# Patient Record
Sex: FEMALE | Race: WHITE | NOT HISPANIC OR LATINO | Employment: OTHER | URBAN - METROPOLITAN AREA
[De-identification: names, ages, dates, MRNs, and addresses within clinical notes are randomized per-mention and may not be internally consistent; named-entity substitution may affect disease eponyms.]

---

## 2017-01-05 ENCOUNTER — APPOINTMENT (OUTPATIENT)
Dept: CARDIAC REHAB | Facility: HOSPITAL | Age: 72
End: 2017-01-05

## 2017-01-07 ENCOUNTER — APPOINTMENT (OUTPATIENT)
Dept: CARDIAC REHAB | Facility: HOSPITAL | Age: 72
End: 2017-01-07

## 2017-01-12 ENCOUNTER — APPOINTMENT (OUTPATIENT)
Dept: CARDIAC REHAB | Facility: HOSPITAL | Age: 72
End: 2017-01-12

## 2017-01-14 ENCOUNTER — APPOINTMENT (OUTPATIENT)
Dept: CARDIAC REHAB | Facility: HOSPITAL | Age: 72
End: 2017-01-14

## 2017-01-19 ENCOUNTER — APPOINTMENT (OUTPATIENT)
Dept: CARDIAC REHAB | Facility: HOSPITAL | Age: 72
End: 2017-01-19

## 2017-01-21 ENCOUNTER — APPOINTMENT (OUTPATIENT)
Dept: CARDIAC REHAB | Facility: HOSPITAL | Age: 72
End: 2017-01-21

## 2017-01-26 ENCOUNTER — APPOINTMENT (OUTPATIENT)
Dept: CARDIAC REHAB | Facility: HOSPITAL | Age: 72
End: 2017-01-26

## 2017-01-28 ENCOUNTER — APPOINTMENT (OUTPATIENT)
Dept: CARDIAC REHAB | Facility: HOSPITAL | Age: 72
End: 2017-01-28

## 2017-01-30 ENCOUNTER — LAB (OUTPATIENT)
Dept: ENDOCRINOLOGY | Age: 72
End: 2017-01-30

## 2017-01-30 DIAGNOSIS — E55.9 UNSPECIFIED VITAMIN D DEFICIENCY: Primary | ICD-10-CM

## 2017-01-30 DIAGNOSIS — E55.9 UNSPECIFIED VITAMIN D DEFICIENCY: ICD-10-CM

## 2017-01-30 DIAGNOSIS — E10.42 DIABETIC PERIPHERAL NEUROPATHY ASSOCIATED WITH TYPE 1 DIABETES MELLITUS (HCC): ICD-10-CM

## 2017-01-30 DIAGNOSIS — E10.8 TYPE 1 DIABETES MELLITUS WITH COMPLICATIONS (HCC): ICD-10-CM

## 2017-01-30 DIAGNOSIS — E03.9 PRIMARY HYPOTHYROIDISM: ICD-10-CM

## 2017-01-30 DIAGNOSIS — I10 ESSENTIAL HYPERTENSION: Primary | ICD-10-CM

## 2017-01-30 DIAGNOSIS — I10 ESSENTIAL HYPERTENSION: ICD-10-CM

## 2017-01-31 LAB
25(OH)D3+25(OH)D2 SERPL-MCNC: 78.5 NG/ML (ref 30–100)
ALBUMIN SERPL-MCNC: 3.9 G/DL (ref 3.5–5.2)
ALBUMIN/GLOB SERPL: 1.5 G/DL
ALP SERPL-CCNC: 111 U/L (ref 39–117)
ALT SERPL-CCNC: 14 U/L (ref 1–33)
AST SERPL-CCNC: 20 U/L (ref 1–32)
BILIRUB SERPL-MCNC: 0.6 MG/DL (ref 0.1–1.2)
BUN SERPL-MCNC: 13 MG/DL (ref 8–23)
BUN/CREAT SERPL: 21.7 (ref 7–25)
CALCIUM SERPL-MCNC: 9.3 MG/DL (ref 8.6–10.5)
CHLORIDE SERPL-SCNC: 99 MMOL/L (ref 98–107)
CHOLEST SERPL-MCNC: 146 MG/DL (ref 0–200)
CO2 SERPL-SCNC: 24.1 MMOL/L (ref 22–29)
CREAT SERPL-MCNC: 0.6 MG/DL (ref 0.57–1)
FT4I SERPL CALC-MCNC: 2.4 (ref 1.2–4.9)
GLOBULIN SER CALC-MCNC: 2.6 GM/DL
GLUCOSE SERPL-MCNC: 115 MG/DL (ref 65–99)
HBA1C MFR BLD: 5.99 % (ref 4.8–5.6)
HDLC SERPL-MCNC: 85 MG/DL (ref 40–60)
LDLC SERPL CALC-MCNC: 51 MG/DL (ref 0–100)
MICROALBUMIN UR-MCNC: 66.3 UG/ML
POTASSIUM SERPL-SCNC: 4.1 MMOL/L (ref 3.5–5.2)
PROT SERPL-MCNC: 6.5 G/DL (ref 6–8.5)
SODIUM SERPL-SCNC: 141 MMOL/L (ref 136–145)
T3FREE SERPL-MCNC: 2 PG/ML (ref 2–4.4)
T3RU NFR SERPL: 37 % (ref 24–39)
T4 FREE SERPL-MCNC: 1.34 NG/DL (ref 0.93–1.7)
T4 SERPL-MCNC: 6.6 UG/DL (ref 4.5–12)
TRIGL SERPL-MCNC: 49 MG/DL (ref 0–150)
TSH SERPL DL<=0.005 MIU/L-ACNC: 4.21 UIU/ML (ref 0.45–4.5)
VLDLC SERPL CALC-MCNC: 9.8 MG/DL (ref 5–40)

## 2017-02-02 ENCOUNTER — APPOINTMENT (OUTPATIENT)
Dept: CARDIAC REHAB | Facility: HOSPITAL | Age: 72
End: 2017-02-02

## 2017-02-04 ENCOUNTER — APPOINTMENT (OUTPATIENT)
Dept: CARDIAC REHAB | Facility: HOSPITAL | Age: 72
End: 2017-02-04

## 2017-02-06 ENCOUNTER — OFFICE VISIT (OUTPATIENT)
Dept: ENDOCRINOLOGY | Age: 72
End: 2017-02-06

## 2017-02-06 VITALS
WEIGHT: 108 LBS | BODY MASS INDEX: 21.2 KG/M2 | HEIGHT: 60 IN | DIASTOLIC BLOOD PRESSURE: 70 MMHG | SYSTOLIC BLOOD PRESSURE: 108 MMHG

## 2017-02-06 DIAGNOSIS — F32.A DEPRESSION, UNSPECIFIED DEPRESSION TYPE: ICD-10-CM

## 2017-02-06 DIAGNOSIS — E10.42 DIABETIC PERIPHERAL NEUROPATHY ASSOCIATED WITH TYPE 1 DIABETES MELLITUS (HCC): Primary | ICD-10-CM

## 2017-02-06 DIAGNOSIS — E03.9 PRIMARY HYPOTHYROIDISM: ICD-10-CM

## 2017-02-06 DIAGNOSIS — E10.8 TYPE 1 DIABETES MELLITUS WITH COMPLICATIONS (HCC): ICD-10-CM

## 2017-02-06 PROCEDURE — 99214 OFFICE O/P EST MOD 30 MIN: CPT | Performed by: NURSE PRACTITIONER

## 2017-02-06 RX ORDER — BUPROPION HYDROCHLORIDE 150 MG/1
TABLET ORAL
Qty: 30 TABLET | Refills: 2 | Status: SHIPPED | OUTPATIENT
Start: 2017-02-06 | End: 2017-05-03 | Stop reason: SDUPTHER

## 2017-02-06 RX ORDER — FUROSEMIDE 20 MG/1
TABLET ORAL
Qty: 90 TABLET | Refills: 1 | Status: SHIPPED | OUTPATIENT
Start: 2017-02-06 | End: 2017-08-13 | Stop reason: SDUPTHER

## 2017-02-06 NOTE — PROGRESS NOTES
Leatha Vazquez who presents for for evaluation and treatment of Type 1 diabetes mellitus insulin dependent.  The initial diagnosis of diabetes was made 30 years ago.      Diabetes location is system with the course being clinical course has fluctuated , the severity is Mild , and the modifying/allievating factors are  insulin pump. Insulin dosage review with Leatha Vazquez suggested compliance most of the time   .       Associated symptoms of hyperglycemia have been none.  Associated symptoms of hypoglycemia have been none. Patient reports  hypoglycemia threshold for symptoms is different blood values mg/dl .       The patient is currently taking home blood tests - Blood glucose testin-10 times daily, that are:  fasting- 1st thing in morning before eating or drinking  before each meal  before each meal and 1 or 2 hours after meal  fasting and bedtime  bedtime  anytime you feel symptoms of hyperglycemia or hypoglycemia (high or low blood sugars)  Novolog U100  per insulin pump      Compliance with blood glucose monitoring: good.     Exercise:rarely      Meal panning: The patient is using carbohydrate counting, but is not on a specified limit, being a pump user.    Home blood glucose testing daily: 8-10  FB to 130 mg/dl  before lunch 150 to 150 mg/dl  before dinner/supper 140 to 150 mg/dl - bedtime 100-170mg/dl  insulin pump upload  -YES - patient manually boluses 100% of the time does not use bolus wizard, she checks her blood sugars 3.6 times a day.  Changes or cannula and her sets out every 4 days.  With sensor meter over view report.  It does show multiple bolusing with hyperglycemia indicating sensitivity values need to be adjusted.  With carbohydrate meals she does a dual wave manually she'll put in half of the bolus R 60% upfront and a small amount about 30-40 minutes later.  Average blood glucose is 134+ or -43.  Readings above target 39% readings below target 3%.  Total daily doses 30.1% plus or  negative to.  Average daily basal is 36% with bolusing 64%.    Patterns reported per patient are none.  With assessment and questioning.  Patient did admit that she has to bolus approximately an hour after she eats and 30 minutes after she corrects a high to correct a high.      The following portions of the patient's history were reviewed and updated as appropriate: current medications, past family history, past medical history, past social history, past surgical history and problem list.        Medical records, chart, and labs reviewed from primary care provider.      Current Outpatient Prescriptions:   •  acetaminophen (ARTHRITIS PAIN RELIEF) 650 MG 8 hr tablet, Take 650 mg by mouth Every 8 (Eight) Hours., Disp: , Rfl:   •  amLODIPine (NORVASC) 5 MG tablet, TAKE ONE TABLET BY MOUTH TWICE A DAY, Disp: 180 tablet, Rfl: 1  •  CHANI CONTOUR NEXT TEST test strip, TESTING 9 X DAY, Disp: 300 each, Rfl: 5  •  Calcium Carbonate-Vit D-Min (CALCIUM 1200) 2689-6519 MG-UNIT chewable tablet, Chew 1 tablet/day.  , Disp: , Rfl:   •  carvedilol (COREG) 25 MG tablet, TAKE ONE TABLET BY MOUTH TWICE A DAY, Disp: 180 tablet, Rfl: 3  •  DULERA 200-5 MCG/ACT inhaler, Inhale 2 puffs 2 (two) times a day.  , Disp: , Rfl:   •  glucagon (GLUCAGEN) 1 MG injection, Glucagon Emergency 1 MG Injection Kit; Patient Sig: Glucagon Emergency 1 MG Injection Kit USE AS DIRECTED.; 1; 3; 02-Feb-2015; Active, Disp: , Rfl:   •  Glucosamine-Chondroit-Vit C-Mn (GLUCOSAMINE 1500 COMPLEX) capsule, Take 1 tablet/day by mouth., Disp: , Rfl:   •  glucose blood test strip, Chani Contour Next Test In Vitro Strip; Patient Sig: Chani Contour Next Test In Vitro Strip TEST BLOOD SUGAR 9 TIMES A DAY AS DIRECTED; 800; 2; 28-Oct-2013; Active, Disp: , Rfl:   •  Insulin Pen Needle 32G X 4 MM misc, , Disp: , Rfl:   •  [employer] LANCETS MICRO THIN 33G misc, , Disp: , Rfl:   •  levothyroxine (SYNTHROID, LEVOTHROID) 50 MCG tablet, Take 1 tablet by mouth Daily., Disp: 90 tablet,  Rfl: 1  •  lisinopril (PRINIVIL,ZESTRIL) 20 MG tablet, Take 20 mg by mouth., Disp: , Rfl:   •  Multiple Vitamin (MULTI-DAY VITAMINS PO), Take 1 tablet/day by mouth daily.  , Disp: , Rfl:   •  Multiple Vitamins-Minerals (PRESERVISION AREDS 2 PO), Take 1 capsule by mouth 2 (Two) Times a Day., Disp: , Rfl:   •  NOVOLOG 100 UNIT/ML injection, USE AS DIRECTED IN INUSLIN PUMP (AVERAGE 50 UNITS DAILY), Disp: 50 mL, Rfl: 1  •  potassium chloride (KLOR-CON) 20 MEQ packet, Take 20 mEq by mouth Daily., Disp: , Rfl:   •  VENTOLIN  (90 BASE) MCG/ACT inhaler, 1 puff daily as needed.  , Disp: , Rfl:   •  vilazodone (VIIBRYD) 40 MG tablet tablet, Take 1 tablet by mouth daily., Disp: 90 tablet, Rfl: 3  •  XARELTO 20 MG tablet, TAKE ONE TABLET BY MOUTH DAILY WITH FOOD, Disp: 90 tablet, Rfl: 2  •  buPROPion XL (WELLBUTRIN XL) 150 MG 24 hr tablet, Take one tab at bedtime, Disp: 30 tablet, Rfl: 2  •  furosemide (LASIX) 20 MG tablet, TAKE ONE TABLET BY MOUTH DAILY, Disp: 90 tablet, Rfl: 1    Patient Active Problem List    Diagnosis   • Insulin pump titration [Z46.81]   • Insulin pump status [Z96.41]   • Primary hypothyroidism [E03.9]   • Panlobular emphysema [J43.1]   • Paroxysmal atrial fibrillation [I48.0]   • Malignant neoplasm of breast [C50.919]   • Congestive heart failure [I50.9]   • Depression [F32.9]   • Diabetic peripheral neuropathy associated with type 1 diabetes mellitus [E10.42]   • Essential hypertension [I10]   • Hearing loss [H91.90]   • Type 1 diabetes mellitus with complications [E10.8]       Review of Systems  A comprehensive review of the 14 systems was negative except of listed below:  Constitutional: fatigue  Behavioral/Psych: positive for depression and fatigue  Endocrine: hyperglycemia     Objective:     Wt Readings from Last 3 Encounters:   02/06/17 108 lb (49 kg)   11/07/16 111 lb 9.6 oz (50.6 kg)   11/04/16 112 lb (50.8 kg)     Temp Readings from Last 3 Encounters:   03/23/16 97.9 °F (36.6 °C) (Oral)  "  11/30/15 98.5 °F (36.9 °C) (Oral)   10/29/15 97.8 °F (36.6 °C) (Oral)     BP Readings from Last 3 Encounters:   02/06/17 108/70   11/07/16 134/62   11/04/16 120/70     Pulse Readings from Last 3 Encounters:   11/07/16 68   11/04/16 66   07/27/16 76         Visit Vitals   • /70   • Ht 60\" (152.4 cm)   • Wt 108 lb (49 kg)   • BMI 21.09 kg/m2       General appearance:  alert, cooperative,orientated, , fatigued, nourished\" \"appears stated age and cooperative\" \"NAD   Neck: no carotid bruit, supple, symmetrical, trachea midline and thyroid not enlarged, symmetric, no tenderness/mass/nodules   Thyroid:  no palpable nodule, no enlargement, no tenderness   Lung:  \"clear to auscultation bilaterally\" \"no abnormal breath sounds  \" effort was normal, no labored breath, no use of accessory muscles.- diminished rj lower lobes   Heart: regular rate and rhythm, S1, S2 normal, no murmur, click, rub or gallop      Abdomen:  normal bowel sounds- 4 quads, soft non-tender and contour - slt rounded      Extremities: [extremities normal, atraumatic, no cyanosis or edema\" WNL - gait and station, strength and stability\"          Skin:   Pulses:  warm and dry, no hyperpigmentation, normal skin coloring, or suspicious lesions  2+ and symmetric   Neuro: Alert and oriented x3. Gait normal. Reflexes and motor strength normal and symmetric. Cranial nerves 2-12 and sensation grossly intact.      Psych: behavior - normal, judgement - normal, mood - normal, affect - normal                 Lab Review      Lab on 01/30/2017   Component Date Value Ref Range Status   • Glucose 01/30/2017 115* 65 - 99 mg/dL Final   • BUN 01/30/2017 13  8 - 23 mg/dL Final   • Creatinine 01/30/2017 0.60  0.57 - 1.00 mg/dL Final   • eGFR Non  Am 01/30/2017 98  >60 mL/min/1.73 Final    Comment: The MDRD GFR formula is only valid for adults with stable  renal function between ages 18 and 70.     • eGFR  Am 01/30/2017 119  >60 mL/min/1.73 Final   • " BUN/Creatinine Ratio 01/30/2017 21.7  7.0 - 25.0 Final   • Sodium 01/30/2017 141  136 - 145 mmol/L Final   • Potassium 01/30/2017 4.1  3.5 - 5.2 mmol/L Final   • Chloride 01/30/2017 99  98 - 107 mmol/L Final   • Total CO2 01/30/2017 24.1  22.0 - 29.0 mmol/L Final   • Calcium 01/30/2017 9.3  8.6 - 10.5 mg/dL Final   • Total Protein 01/30/2017 6.5  6.0 - 8.5 g/dL Final   • Albumin 01/30/2017 3.90  3.50 - 5.20 g/dL Final   • Globulin 01/30/2017 2.6  gm/dL Final   • A/G Ratio 01/30/2017 1.5  g/dL Final   • Total Bilirubin 01/30/2017 0.6  0.1 - 1.2 mg/dL Final   • Alkaline Phosphatase 01/30/2017 111  39 - 117 U/L Final   • AST (SGOT) 01/30/2017 20  1 - 32 U/L Final   • ALT (SGPT) 01/30/2017 14  1 - 33 U/L Final   • Hemoglobin A1C 01/30/2017 5.99* 4.80 - 5.60 % Final    Comment: Hemoglobin A1C Ranges:  Increased Risk for Diabetes  5.7% to 6.4%  Diabetes                     >= 6.5%  Diabetic Goal                < 7.0%     • Microalbumin, Urine 01/30/2017 66.3  Not Estab. ug/mL Final   • Free T4 01/30/2017 1.34  0.93 - 1.70 ng/dL Final   • T3, Free 01/30/2017 2.0  2.0 - 4.4 pg/mL Final   • TSH 01/30/2017 4.210  0.450 - 4.500 uIU/mL Final   • T4, Total 01/30/2017 6.6  4.5 - 12.0 ug/dL Final   • T3 Uptake 01/30/2017 37  24 - 39 % Final   • Free Thyroxine Index 01/30/2017 2.4  1.2 - 4.9 Final   • Total Cholesterol 01/30/2017 146  0 - 200 mg/dL Final   • Triglycerides 01/30/2017 49  0 - 150 mg/dL Final   • HDL Cholesterol 01/30/2017 85* 40 - 60 mg/dL Final   • VLDL Cholesterol 01/30/2017 9.8  5 - 40 mg/dL Final   • LDL Cholesterol  01/30/2017 51  0 - 100 mg/dL Final       Assessment:   Leatha was seen today for follow-up.    Diagnoses and all orders for this visit:    Diabetic peripheral neuropathy associated with type 1 diabetes mellitus    Type 1 diabetes mellitus with complications  -     Ambulatory Referral to Diabetic Education  -     Ambulatory Referral to Diabetic Education    Primary hypothyroidism    Depression,  unspecified depression type  -     buPROPion XL (WELLBUTRIN XL) 150 MG 24 hr tablet; Take one tab at bedtime          Plan:       Education:  interpretation of lab results, blood sugar goals, complications of diabetes mellitus, hypoglycemia prevention and treatment, exercise, illness management, self-monitoring of blood glucose skills, nutrition and carbohydrate counting    home blood tests -  Blood glucose testin times daily, that are:  fasting- 1st thing in morning before eating or drinking  before each meal and 1 or 2 hours after meal  bedtime  anytime you feel symptoms of hyperglycemia or hypoglycemia (high or low blood sugars)    Office visit 24 minutes with additional education - 12 minutes: Need for I pro, insulin up grade, insulin upload, insulin pump changes, instruction on how to use bolus wizard, education needed with instructors within they will call and set up.  No pumps on the market and how they will be covered      Return in about 4 weeks (around 3/6/2017).

## 2017-02-08 ENCOUNTER — TELEPHONE (OUTPATIENT)
Dept: ENDOCRINOLOGY | Age: 72
End: 2017-02-08

## 2017-02-08 NOTE — TELEPHONE ENCOUNTER
----- Message from MELISSA Fernandez sent at 2/7/2017  4:29 PM EST -----  Contact: patient  The helpline will walk her through that if she will call them but if she hits the dual wave and walk through it and will it will do that for her just a call back helpline and have them walk through a dual wave with her      ----- Message -----     From: Rossy Bravo MA     Sent: 2/7/2017   3:43 PM       To: MELISSA Fernandez    I spoke with the patient and she states that she does not understand how to enter the time for the insulin delivery in the square wave setting. I tried to advise the patient to call the helpline but she insisted that they wouldn't help her. I'm not sure how to direct her in changing this. Please advise.     ----- Message -----     From: Shante Andrews     Sent: 2/7/2017  10:32 AM       To: Rossy Bravo MA    Patient says that you discussed the pump with her and she is wanting to speak with you again regarding the pump. Patient says she was asked to switch to square wave and needs clarification.

## 2017-02-09 ENCOUNTER — APPOINTMENT (OUTPATIENT)
Dept: CARDIAC REHAB | Facility: HOSPITAL | Age: 72
End: 2017-02-09

## 2017-02-11 ENCOUNTER — APPOINTMENT (OUTPATIENT)
Dept: CARDIAC REHAB | Facility: HOSPITAL | Age: 72
End: 2017-02-11

## 2017-02-27 RX ORDER — POTASSIUM CHLORIDE 1500 MG/1
TABLET, EXTENDED RELEASE ORAL
Qty: 90 TABLET | Refills: 0 | Status: SHIPPED | OUTPATIENT
Start: 2017-02-27 | End: 2017-05-08

## 2017-03-10 ENCOUNTER — OFFICE VISIT (OUTPATIENT)
Dept: INTERNAL MEDICINE | Facility: CLINIC | Age: 72
End: 2017-03-10

## 2017-03-10 VITALS
DIASTOLIC BLOOD PRESSURE: 68 MMHG | HEIGHT: 60 IN | SYSTOLIC BLOOD PRESSURE: 130 MMHG | HEART RATE: 72 BPM | BODY MASS INDEX: 21.24 KG/M2 | WEIGHT: 108.2 LBS

## 2017-03-10 DIAGNOSIS — E03.9 PRIMARY HYPOTHYROIDISM: ICD-10-CM

## 2017-03-10 DIAGNOSIS — E10.42 DIABETIC PERIPHERAL NEUROPATHY ASSOCIATED WITH TYPE 1 DIABETES MELLITUS (HCC): Primary | ICD-10-CM

## 2017-03-10 DIAGNOSIS — F32.A DEPRESSION, UNSPECIFIED DEPRESSION TYPE: ICD-10-CM

## 2017-03-10 DIAGNOSIS — J43.1 PANLOBULAR EMPHYSEMA (HCC): ICD-10-CM

## 2017-03-10 DIAGNOSIS — I48.0 PAROXYSMAL ATRIAL FIBRILLATION (HCC): ICD-10-CM

## 2017-03-10 DIAGNOSIS — I50.42 CHRONIC COMBINED SYSTOLIC AND DIASTOLIC CONGESTIVE HEART FAILURE (HCC): ICD-10-CM

## 2017-03-10 PROCEDURE — 99214 OFFICE O/P EST MOD 30 MIN: CPT | Performed by: INTERNAL MEDICINE

## 2017-03-10 NOTE — PROGRESS NOTES
Subjective   Leatha Vazquez is a 71 y.o. female.     History of Present Illness she is here today for follow-up of hypertension, paroxysmal atrial fibrillation, congestive heart failure, and depression.  She was started on Wellbutrin in addition to the Viibryd a month ago.  She feels less depressed though she still lacks motivation to do things.  She is also attending hospice counseling sessions twice per month.    From a pulmonary standpoint she is doing fairly well.  She has daily cough with occasional sputum production but this has diminished since her last visit.  She has dyspnea when walking a block but only if she tries to speed her pace up somewhat.  She denies any wheezing or PND.  She has not had any chest pain.  She denies any dizziness or focal neurologic symptoms.  She had a normal eye exam last year.  She denies any abdominal pain, melanotic stool, or rectal bleeding.  She denies claudication.        Review of Systems   Constitutional: Positive for fatigue. Negative for activity change, appetite change and fever.   HENT: Negative for trouble swallowing.    Respiratory: Positive for cough and shortness of breath. Negative for chest tightness and wheezing.    Cardiovascular: Negative for chest pain, palpitations and leg swelling.   Gastrointestinal: Negative for abdominal pain, anal bleeding, blood in stool, constipation, diarrhea, nausea and vomiting.   Genitourinary: Negative for difficulty urinating, dysuria, flank pain, frequency and hematuria.   Musculoskeletal: Positive for gait problem. Negative for arthralgias and back pain.   Neurological: Positive for numbness. Negative for dizziness, syncope, facial asymmetry, speech difficulty and headaches.   Psychiatric/Behavioral: Positive for dysphoric mood. Negative for confusion. The patient is not nervous/anxious.        Objective   Physical Exam   Constitutional: She is oriented to person, place, and time. Vital signs are normal. She appears  well-developed and well-nourished. She is active. She does not appear ill.   Eyes: Conjunctivae are normal.   Fundoscopic exam:       The right eye shows no AV nicking, no exudate and no hemorrhage.        The left eye shows no AV nicking, no exudate and no hemorrhage.   Neck: Carotid bruit is not present. No thyroid mass and no thyromegaly present.   Cardiovascular: Normal rate, regular rhythm, S1 normal and S2 normal.  Exam reveals no S3 and no S4.    No murmur heard.  Pulses:       Dorsalis pedis pulses are 0 on the right side, and 0 on the left side.        Posterior tibial pulses are 0 on the right side, and 0 on the left side.   Skin color and temperature of the feet are normal.   Pulmonary/Chest: No tachypnea. No respiratory distress. She has no decreased breath sounds. She has no wheezes. She has no rhonchi. She has no rales.   Abdominal: Soft. Normal appearance and bowel sounds are normal. She exhibits no abdominal bruit and no mass. There is no hepatosplenomegaly. There is no tenderness.       Vascular Status -  Her exam exhibits no right foot edema. Her exam exhibits no left foot edema.  Neurological: She is alert and oriented to person, place, and time. Gait abnormal.   Reflex Scores:       Bicep reflexes are 2+ on the right side.  Mild slowing but stable with use of a cane   Psychiatric: She has a normal mood and affect. Her speech is normal and behavior is normal. Judgment and thought content normal. Cognition and memory are normal.       Assessment/Plan  recent lab showed a normal CMP and free T3.  Total cholesterol was 146 triglycerides 49 HDL cholesterol 85 LDL cholesterol 51.  Hemoglobin A1c was 5.99.    Assessment #1 hypertension-good control #2 diabetes mellitus-very good control #3 chronic depression-improving on present regimen #4 PAF-without recurrence #5 emphysema-moderately severe but compensated.    Plan #1 no change medication.  Routine follow-up with me in 6 months.

## 2017-03-20 RX ORDER — RIVAROXABAN 20 MG/1
TABLET, FILM COATED ORAL
Qty: 90 TABLET | Refills: 3 | Status: SHIPPED | OUTPATIENT
Start: 2017-03-20

## 2017-03-27 ENCOUNTER — TREATMENT (OUTPATIENT)
Dept: ENDOCRINOLOGY | Age: 72
End: 2017-03-27

## 2017-03-27 DIAGNOSIS — Z96.41 INSULIN PUMP STATUS: ICD-10-CM

## 2017-03-27 DIAGNOSIS — E10.8 TYPE 1 DIABETES MELLITUS WITH COMPLICATIONS (HCC): ICD-10-CM

## 2017-03-27 DIAGNOSIS — Z79.4 LONG-TERM INSULIN USE (HCC): ICD-10-CM

## 2017-03-27 DIAGNOSIS — E10.42 DIABETIC PERIPHERAL NEUROPATHY ASSOCIATED WITH TYPE 1 DIABETES MELLITUS (HCC): Primary | ICD-10-CM

## 2017-03-27 PROCEDURE — 95250 CONT GLUC MNTR PHYS/QHP EQP: CPT | Performed by: NURSE PRACTITIONER

## 2017-03-30 ENCOUNTER — TELEPHONE (OUTPATIENT)
Dept: ENDOCRINOLOGY | Age: 72
End: 2017-03-30

## 2017-03-30 NOTE — TELEPHONE ENCOUNTER
----- Message from Lucinda Graves sent at 3/30/2017 12:28 PM EDT -----  Contact: PATIENT CAME TO CHECK OUT  PATIENT HAD GLUCOSE MONITOR REMOVED TODAY AND SAID SHE WILL WAIT TO GET RESULTS WHEN SHE SEES DR. GOODEN ON 5-8-17. IF QUESTIONS -[phone]

## 2017-04-05 ENCOUNTER — TREATMENT (OUTPATIENT)
Dept: ENDOCRINOLOGY | Age: 72
End: 2017-04-05

## 2017-04-05 DIAGNOSIS — E10.8 TYPE 1 DIABETES MELLITUS WITH COMPLICATIONS (HCC): Primary | ICD-10-CM

## 2017-04-05 DIAGNOSIS — Z96.41 INSULIN PUMP STATUS: ICD-10-CM

## 2017-04-05 DIAGNOSIS — Z79.4 LONG-TERM INSULIN USE (HCC): ICD-10-CM

## 2017-04-05 PROCEDURE — 95251 CONT GLUC MNTR ANALYSIS I&R: CPT | Performed by: NURSE PRACTITIONER

## 2017-04-05 NOTE — PROGRESS NOTES
The iPro Continuous Glucose Monitor is not implanted! A tiny glucose sensor at the end of the recorder will be inserted under the skin in a virtually painless process. Once it is inserted you will continue your daily routine as usual. Adverse reactions associated with glucose sensor insertion are highly uncommon and are limited to bleeding, irritation, pain, rash, infection, raised bump, and irritation at the site from the tape or bandage to secure the iPro CGM to the skin.    Glucose measurements are automatically collected and stored in the recorder. A total of 288 glucose readings- every 5 minutes throughout the day are recorded. Once your testing is complete, personalized reports will be generated allowing you and your doctor to fine tune your diabetes therapy. Remember, it does not display glucose values and is not intended to replace home glucose monitoring.    • Take blood glucose readings 4 times a day for the next 3 days  • Record these and daily events such as meals, insulin (if you take) and exercise in the Patient Logbook you are given.    o Test with your BG meter 1 hour after the start of your iPro study (and not before 1 hour) and record the exact time in your logbook.   o Test 4 times a day using your BG meter before breakfast, lunch, dinner and before bed and record the exact time in your logbook.     • Do not change any settings on your BG meter during the study and do not let anyone else use your meter during the study.    • Protect the iPro Recorder and glucose sensor site from accidental removal and refrain from contact sports or activities which may damage the monitor.     • If you are to have an x-ray, CT or MRI, the iPro needs to be removed prior to doing so.     • The iPro is waterproof, but should not be worn swimming for longer than 30 minutes at a depth of 8 feet. Hot baths should be avoided for longer than 10 minutes.     • Avoid wearing or being around magnets while wearing the iPro  (examples: cell phone villafuerte, bracelet, belt, magnetic mattress pad).     • Do not administer insulin within 3 inches of the glucose sensor site.     • If tape peels back/becomes loose-do not remove or you could pull the sensor out! Place another piece of tape or band aid on top to secure.     • If the iPro accidentally falls off do not take apart any of the pieces…..keep the tape, sensor and recorder attached-in one piece-as it came off your body. Do not try to clean the iPro. Place this in a ziplock bag and bring back to the clinic with your logbook and glucose meter if one was provided to you  Please come back to your doctor’s office and have the iPro,  removed by the office staff.  Do not remove yourself.           Test your blood sugar levels 4 times per day:    • Before Breakfast  • Before Lunch  • Before Dinner  • Before Bed      Be sure to fill out your log sheets.     Test your blood sugar 15 minutes before your iPro being removed.     If you have any questions or concerns while wearing the device please call the office. [phone]

## 2017-04-05 NOTE — PROGRESS NOTES
Observed patterns:    1.  Hypoglycemic overnight time to 11 PM through 6 AM.  3 out of 4 days excursions observed: 3 days blood glucose readings 50 through 80 mg/Cora.    2.  Hyperglycemic post breakfast time 6 AM through 10 AM.  2 out of 4 days excursions observed: 2 days being 180 through 250 mg/Cora.    3.  Hyperglycemic post dinner time and 5 PM through 8 PM.  2 out of 4 days excursions observed: 2 days  through 250 mg/Cora.    Time in range: 26% the time above 150 mg/Cora  67% the time in target range  7% of the time below 70  Average glucose estimated at 124 mg/Cora  Estimated A1c to be at 5.9%  Per grafting estimated at 8 AM through 10 AM spiking as well as 5 PM through 8 PM.    Sensor information  There were 802 readings in the study  Highest reading 232 g/Cora  Lowest 63 mg/Cora   average 124 mg/Cora  Standard deviation 124  MAD 28.3%    Excursion summary there were 7 in the study  Hyperglycemic excursions 5 and hypo-work to it appears that grafting hyper to excursion appears between 8 AM and 10 1 at 12 PM to 2 PM and 2 at 8 PM through 10 PM hypo-excursions appeared at 10 AM through 12 PM.    Overlay by meal event  Daily average with meals  Before breakfast 100 mg/Cora  After breakfast 144 mg/Cora  Before lunch 121 mg/Cora  After lunch 161 mg/Cora  Before dinner 131 mg/Cora  After dinner 163 mg/Cora.    Meal and patient log review  Patient completed log very well blood glucoses documented on log from 55 through 162 mg/Cora meals were documented as well as 9's-it does not appear that she eats very high amount of meals.  Does not eat high carb meals max carb intake even with spaghetti meatball dinner was 45 g and that consisted of spaghetti meatball garlic bread and cookies and she counted 45 g and took 8 units.  Another large meal was crispy chicken salad with recall crackers and counted 30 g of carbohydrates another main dinner was 4 ounces of red by a half a baked potato and a half a cup at  [facility] and counted  45 g and took 6 units that the appropriate amount    Present therapy insulin pump therapy  Basals at 12 AM-0.65 units per hour, 2 AM-0.5 units per hour, 7 AM-0.4 units per hour, 1 PM-0.35 units per hour, 8 PM-0.55 units per hour  Carb ratio one unit for every 15 g  1 unit for every 40 with a ratio 120 through 150  Active insulin 4 hours    Future therapy  Ensure patient is using dual wave bolusing  Incorporate more carbs into diet    Basal changes to changed to  12 AM 0.625 units per hour  2 AM 0.475 units per hour  7 AM 0.4 units per hour  1 PM 0.35 units per hour  8 PM 0.55 units per hour    Carb ratios to remain at 1 unit for every 15  Sensitivity 1 unit for every 40 with target 110 through 120

## 2017-04-09 DIAGNOSIS — E03.8 SUBCLINICAL HYPOTHYROIDISM: ICD-10-CM

## 2017-04-10 RX ORDER — LEVOTHYROXINE SODIUM 0.05 MG/1
TABLET ORAL
Qty: 90 TABLET | Refills: 0 | Status: SHIPPED | OUTPATIENT
Start: 2017-04-10 | End: 2017-05-08

## 2017-04-15 DIAGNOSIS — E03.8 SUBCLINICAL HYPOTHYROIDISM: ICD-10-CM

## 2017-04-17 DIAGNOSIS — I10 ESSENTIAL HYPERTENSION: Primary | ICD-10-CM

## 2017-04-17 RX ORDER — LEVOTHYROXINE SODIUM 0.05 MG/1
TABLET ORAL
Qty: 90 TABLET | Refills: 1 | Status: SHIPPED | OUTPATIENT
Start: 2017-04-17 | End: 2017-09-15 | Stop reason: SDUPTHER

## 2017-04-17 RX ORDER — AMLODIPINE BESYLATE 5 MG/1
TABLET ORAL
Qty: 180 TABLET | Refills: 3 | Status: SHIPPED | OUTPATIENT
Start: 2017-04-17

## 2017-04-17 RX ORDER — CARVEDILOL 25 MG/1
TABLET ORAL
Qty: 180 TABLET | Refills: 3 | Status: SHIPPED | OUTPATIENT
Start: 2017-04-17

## 2017-04-17 RX ORDER — CARVEDILOL 25 MG/1
TABLET ORAL
Qty: 180 TABLET | Refills: 2 | OUTPATIENT
Start: 2017-04-17

## 2017-04-17 RX ORDER — AMLODIPINE BESYLATE 5 MG/1
TABLET ORAL
Qty: 180 TABLET | Refills: 0 | OUTPATIENT
Start: 2017-04-17

## 2017-05-03 DIAGNOSIS — F32.A DEPRESSION, UNSPECIFIED DEPRESSION TYPE: ICD-10-CM

## 2017-05-04 RX ORDER — BUPROPION HYDROCHLORIDE 150 MG/1
TABLET ORAL
Qty: 30 TABLET | Refills: 1 | Status: SHIPPED | OUTPATIENT
Start: 2017-05-04 | End: 2017-07-03 | Stop reason: SDUPTHER

## 2017-05-08 ENCOUNTER — OFFICE VISIT (OUTPATIENT)
Dept: ENDOCRINOLOGY | Age: 72
End: 2017-05-08

## 2017-05-08 VITALS
WEIGHT: 109.6 LBS | DIASTOLIC BLOOD PRESSURE: 62 MMHG | SYSTOLIC BLOOD PRESSURE: 144 MMHG | HEART RATE: 56 BPM | BODY MASS INDEX: 21.52 KG/M2 | OXYGEN SATURATION: 95 % | HEIGHT: 60 IN

## 2017-05-08 DIAGNOSIS — E10.42 DIABETIC PERIPHERAL NEUROPATHY ASSOCIATED WITH TYPE 1 DIABETES MELLITUS (HCC): Primary | ICD-10-CM

## 2017-05-08 DIAGNOSIS — I10 ESSENTIAL HYPERTENSION: ICD-10-CM

## 2017-05-08 DIAGNOSIS — Z46.81 INSULIN PUMP TITRATION: ICD-10-CM

## 2017-05-08 DIAGNOSIS — Z96.41 INSULIN PUMP STATUS: ICD-10-CM

## 2017-05-08 DIAGNOSIS — E03.9 PRIMARY HYPOTHYROIDISM: ICD-10-CM

## 2017-05-08 DIAGNOSIS — J43.1 PANLOBULAR EMPHYSEMA (HCC): ICD-10-CM

## 2017-05-08 PROCEDURE — 99214 OFFICE O/P EST MOD 30 MIN: CPT | Performed by: INTERNAL MEDICINE

## 2017-05-08 RX ORDER — DULOXETIN HYDROCHLORIDE 30 MG/1
30 CAPSULE, DELAYED RELEASE ORAL
COMMUNITY
End: 2017-06-02

## 2017-05-08 RX ORDER — PHENOL 1.4 %
1200 AEROSOL, SPRAY (ML) MUCOUS MEMBRANE
COMMUNITY

## 2017-05-08 RX ORDER — TRAMADOL HYDROCHLORIDE 50 MG/1
50 TABLET ORAL EVERY 6 HOURS
COMMUNITY
Start: 2014-09-16 | End: 2017-05-08

## 2017-05-09 LAB
ALBUMIN SERPL-MCNC: 4 G/DL (ref 3.5–5.2)
ALBUMIN/GLOB SERPL: 1.3 G/DL
ALP SERPL-CCNC: 85 U/L (ref 39–117)
ALT SERPL-CCNC: 16 U/L (ref 1–33)
AST SERPL-CCNC: 17 U/L (ref 1–32)
BILIRUB SERPL-MCNC: 0.8 MG/DL (ref 0.1–1.2)
BUN SERPL-MCNC: 17 MG/DL (ref 8–23)
BUN/CREAT SERPL: 25 (ref 7–25)
CALCIUM SERPL-MCNC: 9.9 MG/DL (ref 8.6–10.5)
CHLORIDE SERPL-SCNC: 96 MMOL/L (ref 98–107)
CO2 SERPL-SCNC: 26.7 MMOL/L (ref 22–29)
CREAT SERPL-MCNC: 0.68 MG/DL (ref 0.57–1)
GLOBULIN SER CALC-MCNC: 3 GM/DL
GLUCOSE SERPL-MCNC: 142 MG/DL (ref 65–99)
HBA1C MFR BLD: 5.8 % (ref 4.8–5.6)
POTASSIUM SERPL-SCNC: 3.9 MMOL/L (ref 3.5–5.2)
PROT SERPL-MCNC: 7 G/DL (ref 6–8.5)
SODIUM SERPL-SCNC: 140 MMOL/L (ref 136–145)

## 2017-05-22 ENCOUNTER — TELEPHONE (OUTPATIENT)
Dept: INTERNAL MEDICINE | Facility: CLINIC | Age: 72
End: 2017-05-22

## 2017-05-22 DIAGNOSIS — F32.A DEPRESSION, UNSPECIFIED DEPRESSION TYPE: Primary | ICD-10-CM

## 2017-05-30 RX ORDER — POTASSIUM CHLORIDE 1500 MG/1
TABLET, EXTENDED RELEASE ORAL
Qty: 90 TABLET | Refills: 3 | Status: SHIPPED | OUTPATIENT
Start: 2017-05-30

## 2017-06-02 ENCOUNTER — OFFICE VISIT (OUTPATIENT)
Dept: INTERNAL MEDICINE | Facility: CLINIC | Age: 72
End: 2017-06-02

## 2017-06-02 VITALS
DIASTOLIC BLOOD PRESSURE: 68 MMHG | BODY MASS INDEX: 21.4 KG/M2 | SYSTOLIC BLOOD PRESSURE: 126 MMHG | WEIGHT: 109 LBS | HEIGHT: 60 IN

## 2017-06-02 DIAGNOSIS — F32.A DEPRESSION, UNSPECIFIED DEPRESSION TYPE: Primary | ICD-10-CM

## 2017-06-02 PROCEDURE — 99213 OFFICE O/P EST LOW 20 MIN: CPT | Performed by: INTERNAL MEDICINE

## 2017-06-02 NOTE — PROGRESS NOTES
Subjective   Leatha Vazquez is a 71 y.o. female.     History of Present Illness she is here today for follow-up on chronic depression.  Her depression is not any worse but it is not any better over a year after her  .  Presently she is on Viibryd as well as Wellbutrin.  In the past she has used both Effexor and Cymbalta without response.  Basically her depression is manifested by lack of motivation to do things and lack of energy.  She sleeps well but her appetite is only fair.  She does attend hospice counseling on a regular basis.        Review of Systems   Constitutional: Positive for fatigue. Negative for activity change and appetite change.   Respiratory: Positive for cough and shortness of breath. Negative for chest tightness and wheezing.    Cardiovascular: Negative for chest pain, palpitations and leg swelling.   Gastrointestinal: Negative for abdominal pain, diarrhea, nausea and vomiting.   Neurological: Negative for dizziness, weakness and headaches.   Psychiatric/Behavioral: Positive for dysphoric mood. Negative for confusion. The patient is not nervous/anxious.        Objective   Physical Exam   Constitutional: She is oriented to person, place, and time. Vital signs are normal. She appears well-developed and well-nourished. She does not appear ill.   Eyes: Conjunctivae are normal.   Cardiovascular: Normal rate, regular rhythm, S1 normal and S2 normal.  Exam reveals no S3 and no S4.    No murmur heard.  Pulmonary/Chest: No tachypnea. No respiratory distress. She has no decreased breath sounds. She has no wheezes. She has no rhonchi. She has no rales.   Abdominal: Soft. Normal appearance and bowel sounds are normal. She exhibits no abdominal bruit and no mass. There is no hepatosplenomegaly. There is no tenderness.       Vascular Status -  Her exam exhibits no right foot edema. Her exam exhibits no left foot edema.  Neurological: She is alert and oriented to person, place, and time.    Psychiatric: Her speech is normal and behavior is normal. Judgment and thought content normal. Cognition and memory are normal. She exhibits a depressed mood.       Assessment/Plan assessment #1 depression-I really feel the best approach at this point would be psychiatric intervention.  This was discussed with the patient and she is in agreement.    Plan #1 no change medication #2 psychiatry consult.  Routine follow-up with me in 4 months.

## 2017-07-03 DIAGNOSIS — F32.A DEPRESSION, UNSPECIFIED DEPRESSION TYPE: ICD-10-CM

## 2017-07-05 RX ORDER — BUPROPION HYDROCHLORIDE 150 MG/1
TABLET ORAL
Qty: 30 TABLET | Refills: 0 | Status: SHIPPED | OUTPATIENT
Start: 2017-07-05 | End: 2017-08-02 | Stop reason: SDUPTHER

## 2017-07-11 DIAGNOSIS — E03.8 SUBCLINICAL HYPOTHYROIDISM: ICD-10-CM

## 2017-07-11 RX ORDER — LEVOTHYROXINE SODIUM 0.05 MG/1
TABLET ORAL
Qty: 90 TABLET | Refills: 1 | Status: SHIPPED | OUTPATIENT
Start: 2017-07-11

## 2017-08-02 DIAGNOSIS — F32.A DEPRESSION, UNSPECIFIED DEPRESSION TYPE: ICD-10-CM

## 2017-08-02 RX ORDER — BUPROPION HYDROCHLORIDE 150 MG/1
TABLET ORAL
Qty: 30 TABLET | Refills: 0 | Status: SHIPPED | OUTPATIENT
Start: 2017-08-02 | End: 2017-09-15 | Stop reason: DRUGHIGH

## 2017-08-14 RX ORDER — FUROSEMIDE 20 MG/1
TABLET ORAL
Qty: 90 TABLET | Refills: 1 | Status: SHIPPED | OUTPATIENT
Start: 2017-08-14 | End: 2018-02-25 | Stop reason: SDUPTHER

## 2017-09-15 ENCOUNTER — OFFICE VISIT (OUTPATIENT)
Dept: INTERNAL MEDICINE | Facility: CLINIC | Age: 72
End: 2017-09-15

## 2017-09-15 ENCOUNTER — OFFICE VISIT (OUTPATIENT)
Dept: ENDOCRINOLOGY | Age: 72
End: 2017-09-15

## 2017-09-15 VITALS
HEART RATE: 72 BPM | SYSTOLIC BLOOD PRESSURE: 120 MMHG | DIASTOLIC BLOOD PRESSURE: 60 MMHG | BODY MASS INDEX: 22.25 KG/M2 | WEIGHT: 106 LBS | HEIGHT: 58 IN

## 2017-09-15 VITALS
OXYGEN SATURATION: 97 % | BODY MASS INDEX: 22.37 KG/M2 | WEIGHT: 106.6 LBS | DIASTOLIC BLOOD PRESSURE: 66 MMHG | HEIGHT: 58 IN | HEART RATE: 61 BPM | SYSTOLIC BLOOD PRESSURE: 142 MMHG

## 2017-09-15 DIAGNOSIS — E10.8 TYPE 1 DIABETES MELLITUS WITH COMPLICATIONS (HCC): Primary | ICD-10-CM

## 2017-09-15 DIAGNOSIS — Z96.41 INSULIN PUMP STATUS: ICD-10-CM

## 2017-09-15 DIAGNOSIS — L98.9 SKIN LESION: ICD-10-CM

## 2017-09-15 DIAGNOSIS — E03.9 PRIMARY HYPOTHYROIDISM: ICD-10-CM

## 2017-09-15 DIAGNOSIS — I48.0 PAROXYSMAL ATRIAL FIBRILLATION (HCC): Primary | ICD-10-CM

## 2017-09-15 DIAGNOSIS — J43.1 PANLOBULAR EMPHYSEMA (HCC): ICD-10-CM

## 2017-09-15 DIAGNOSIS — I10 ESSENTIAL HYPERTENSION: ICD-10-CM

## 2017-09-15 DIAGNOSIS — I50.42 CHRONIC COMBINED SYSTOLIC AND DIASTOLIC CONGESTIVE HEART FAILURE (HCC): ICD-10-CM

## 2017-09-15 DIAGNOSIS — H35.30 MACULAR DEGENERATION: ICD-10-CM

## 2017-09-15 DIAGNOSIS — F32.A DEPRESSION, UNSPECIFIED DEPRESSION TYPE: ICD-10-CM

## 2017-09-15 PROBLEM — Z46.81 INSULIN PUMP TITRATION: Status: RESOLVED | Noted: 2017-05-08 | Resolved: 2017-09-15

## 2017-09-15 PROCEDURE — 99214 OFFICE O/P EST MOD 30 MIN: CPT | Performed by: INTERNAL MEDICINE

## 2017-09-15 RX ORDER — BUPROPION HYDROCHLORIDE 300 MG/1
300 TABLET ORAL EVERY MORNING
COMMUNITY
Start: 2017-08-04

## 2017-09-15 NOTE — PROGRESS NOTES
"Subjective   Leatha Vazquez is a 72 y.o. female.     History of Present Illness she is here today for follow-up of hypertension, congestive heart failure, paroxysmal atrial fibrillation, emphysema, and chronic depression.  She finally did see a psychiatrist and sees him on a three-month basis.  He increased her Wellbutrin and discontinue Viibryd.  She \"feels better inside\" although she still has to push herself to do things and still lacks motivation to do things.  Things are getting done around the home however.  She has occasional coughing and wheezing but nothing persistent or severe.  She denies any PND or dyspnea on exertion.  She has not been having any chest pain or dizziness.  She denies any focal neurologic symptoms.  Over the last several months she has had some postprandial distention in the upper abdomen although not on a daily basis.  There is no associated nausea and vomiting or pain.  She denied any melanotic stool, rectal bleeding, or change in bowel habit.  She has a mammogram scheduled for November for follow-up of her breast cancer.        Review of Systems   Constitutional: Negative for activity change, appetite change and fatigue.   HENT: Negative for trouble swallowing.    Respiratory: Positive for cough and wheezing. Negative for chest tightness and shortness of breath.    Cardiovascular: Negative for chest pain, palpitations and leg swelling.   Gastrointestinal: Negative for abdominal pain, anal bleeding, blood in stool, constipation, diarrhea, nausea and vomiting.   Genitourinary: Negative for dysuria, flank pain and hematuria.   Neurological: Negative for dizziness, syncope, facial asymmetry, speech difficulty, weakness, numbness and headaches.   Psychiatric/Behavioral: Positive for dysphoric mood. Negative for confusion. The patient is not nervous/anxious.        Objective   Physical Exam   Constitutional: She is oriented to person, place, and time. Vital signs are normal. She appears " well-developed and well-nourished. She is active. She does not appear ill.   Eyes: Conjunctivae are normal.   Neck: Carotid bruit is not present. No thyroid mass and no thyromegaly present.   Cardiovascular: Normal rate, regular rhythm, S1 normal and S2 normal.  Exam reveals no S3 and no S4.    No murmur heard.  Pulses:       Dorsalis pedis pulses are 0 on the right side, and 0 on the left side.        Posterior tibial pulses are 0 on the right side, and 0 on the left side.   Normal color and temperature of the feet   Pulmonary/Chest: No tachypnea. No respiratory distress. She has decreased breath sounds. She has no wheezes. She has no rhonchi. She has no rales.   Abdominal: Soft. Normal appearance and bowel sounds are normal. She exhibits no abdominal bruit and no mass. There is no hepatosplenomegaly. There is no tenderness.       Vascular Status -  Her exam exhibits no right foot edema. Her exam exhibits no left foot edema.  Neurological: She is alert and oriented to person, place, and time. Gait abnormal.   There is a limp favoring the right leg but her walk is otherwise normal with the use of a cane.   Psychiatric: She has a normal mood and affect. Her speech is normal and behavior is normal. Judgment and thought content normal. Cognition and memory are normal.       Assessment/Plan recent lab showed a hemoglobin A1c of 5.8.  Free T3 and CMP were normal.  Total cholesterol 146 triglycerides 49 HDL cholesterol 85 LDL cholesterol 51    Assessment #1 hypertension-good control #2 history of congestive heart failure-without recurrence #3 history of PAF-without recurrence #4 emphysema-moderately severe but stable and she remains fairly active #5 chronic depression-somewhat improved and this is likely as good as she is never going to be.  #6 abdominal distention-likely gastroparesis but not severe enough to warrant specific therapy at this time.    Plan #1 no change medication #2 mammogram in November of this year.   Routine follow-up with me in 6 months.

## 2017-09-15 NOTE — PROGRESS NOTES
Subjective   Leatha Vazquez is a 72 y.o. female.     HPI Comments: F/u for dm 1,hypertension,hypothyroidism, copd,atrial fib / testing bs 8-9 x day / last dm eye exam 6/26/17 with dr Guadarrama / last dm foot exam 5/8/17 with dr Chino     Diabetes     Hypertension     Hypothyroidism     COPD     Atrial Fibrillation   Past medical history includes atrial fibrillation.      Patient is a 71-year-old female who came in for followup. She was diagnosed to have diabetes mellitus in 1982 and was started on insulin then. She was started on an insulin pump in 2006. She is using an Medtronic Paradigm pump since 2012.      Basal rates: 12 midnight is 0.65. 2 AM is 0.5. 7 AM is 0.4. 1 PM is 0.35. 8 PM is 0.55.      Bolus: 1 unit per 15 gram CHO      Sensitivity: 1 unit per 40 above 120-150.       She checks blood sugar at least 4 times a day.  Fasting glucose runs between .  Mealtime blood sugar runs between .  She denies any severe hypoglycemic episode.  She has lost 2 pounds since May 2017.  Her last meal was 8 AM      Her last eye examination was in June 2017. She has macular degeneration and cataracts but no diabetic retinopathy.. She has numbness in her feet but no pain. She has no microalbuminuria on urine sample taken last 1/17      She has history of hypertension and paroxysmal atrial fibrillation. She is on Norvasc, Coreg, Lasix, Xarelto and lisinopril. She follows with Dr. Anthony. She denies any history of hyperlipidemia, heart attack or stroke. She denies any chest pain, palpitations, or pedal edema.      She has COPD and follows with Dr. Jacobo. She denies increased SOB.  She is on Dulera and albuterol. She denies increased sputum production.       She has primary hypothyroidism and has been on levothyroxine 50 µg per day. She denies heat/cold intolerance.  She denies bowel changes.       Depression has improved with WellbutrinXL prescribed by Dr. Kuhn. She is going through grief counseling.    The  "following portions of the patient's history were reviewed and updated as appropriate: allergies, current medications, past family history, past medical history, past social history, past surgical history and problem list.    Review of Systems   Constitutional: Negative.    HENT: Negative.    Eyes: Negative.    Respiratory: Negative.    Cardiovascular: Negative.    Gastrointestinal: Negative.    Endocrine: Negative.    Genitourinary: Positive for difficulty urinating ( bladder leakage ).   Musculoskeletal: Negative.    Skin: Negative.    Allergic/Immunologic: Negative.    Neurological: Negative.    Hematological: Bruises/bleeds easily ( bruise ).       Objective      Vitals:    09/15/17 1150   BP: 142/66   BP Location: Left arm   Patient Position: Sitting   Cuff Size: Small Adult   Pulse: 61   SpO2: 97%   Weight: 106 lb 9.6 oz (48.4 kg)   Height: 58\" (147.3 cm)     Physical Exam   Constitutional: She is oriented to person, place, and time. She appears well-developed and well-nourished. No distress.   HENT:   Head: Normocephalic.   Nose: Nose normal.   Mouth/Throat: No oropharyngeal exudate.   Eyes: Conjunctivae and EOM are normal. Right eye exhibits no discharge. Left eye exhibits no discharge. No scleral icterus.   Neck: Neck supple. No JVD present. No tracheal deviation present. No thyromegaly present.   Cardiovascular: Normal rate, regular rhythm, normal heart sounds and intact distal pulses.  Exam reveals no gallop and no friction rub.    No murmur heard.  Pulmonary/Chest: Effort normal and breath sounds normal. No respiratory distress. She has no wheezes. She has no rales. She exhibits no tenderness.   Abdominal: Soft. Bowel sounds are normal. She exhibits no distension and no mass. There is no tenderness.   Musculoskeletal: Normal range of motion. She exhibits no edema or deformity.   Lymphadenopathy:     She has no cervical adenopathy.   Neurological: She is alert and oriented to person, place, and time. She " displays normal reflexes.   Intact light touch in the distal lower extremities   Skin: Skin is warm and dry. No erythema. No pallor.   Psychiatric: She has a normal mood and affect. Her behavior is normal.     Office Visit on 05/08/2017   Component Date Value Ref Range Status   • Glucose 05/08/2017 142* 65 - 99 mg/dL Final   • BUN 05/08/2017 17  8 - 23 mg/dL Final   • Creatinine 05/08/2017 0.68  0.57 - 1.00 mg/dL Final   • eGFR Non African Am 05/08/2017 85  >60 mL/min/1.73 Final    Comment: The MDRD GFR formula is only valid for adults with stable  renal function between ages 18 and 70.     • eGFR  Am 05/08/2017 103  >60 mL/min/1.73 Final   • BUN/Creatinine Ratio 05/08/2017 25.0  7.0 - 25.0 Final   • Sodium 05/08/2017 140  136 - 145 mmol/L Final   • Potassium 05/08/2017 3.9  3.5 - 5.2 mmol/L Final   • Chloride 05/08/2017 96* 98 - 107 mmol/L Final   • Total CO2 05/08/2017 26.7  22.0 - 29.0 mmol/L Final   • Calcium 05/08/2017 9.9  8.6 - 10.5 mg/dL Final   • Total Protein 05/08/2017 7.0  6.0 - 8.5 g/dL Final   • Albumin 05/08/2017 4.00  3.50 - 5.20 g/dL Final   • Globulin 05/08/2017 3.0  gm/dL Final   • A/G Ratio 05/08/2017 1.3  g/dL Final   • Total Bilirubin 05/08/2017 0.8  0.1 - 1.2 mg/dL Final   • Alkaline Phosphatase 05/08/2017 85  39 - 117 U/L Final   • AST (SGOT) 05/08/2017 17  1 - 32 U/L Final   • ALT (SGPT) 05/08/2017 16  1 - 33 U/L Final   • Hemoglobin A1C 05/08/2017 5.80* 4.80 - 5.60 % Final    Comment: Hemoglobin A1C Ranges:  Increased Risk for Diabetes  5.7% to 6.4%  Diabetes                     >= 6.5%  Diabetic Goal                < 7.0%       Assessment/Plan   Leatha was seen today for diabetes, hypertension, hypothyroidism, copd and atrial fibrillation.    Diagnoses and all orders for this visit:    Type 1 diabetes mellitus with complications  -     Comprehensive Metabolic Panel  -     Lipid Panel  -     Hemoglobin A1c    Primary hypothyroidism  -     Lipid Panel  -     TSH  -     T4,  Free    Insulin pump status    Macular degeneration    Essential hypertension      Continue on current insulin pump settings.  Check hemoglobin A1c.  Continue levothyroxine 50 µg per day.  Check thyroid function tests.  Follow-up with ophthalmologist as scheduled.  Will defer blood pressure control to cardiologist.  Flu vaccine this fall.  Advised to discuss with Dr. Kuhn about tetanus pertussis vaccine.    Send copy of my notes and labs to Dr. Anthony and Dr. Kuhn.    RTC 4 mos.

## 2017-09-16 LAB
ALBUMIN SERPL-MCNC: 4.1 G/DL (ref 3.5–5.2)
ALBUMIN/GLOB SERPL: 1.6 G/DL
ALP SERPL-CCNC: 73 U/L (ref 39–117)
ALT SERPL-CCNC: 17 U/L (ref 1–33)
AST SERPL-CCNC: 19 U/L (ref 1–32)
BILIRUB SERPL-MCNC: 0.5 MG/DL (ref 0.1–1.2)
BUN SERPL-MCNC: 13 MG/DL (ref 8–23)
BUN/CREAT SERPL: 18.6 (ref 7–25)
CALCIUM SERPL-MCNC: 10 MG/DL (ref 8.6–10.5)
CHLORIDE SERPL-SCNC: 97 MMOL/L (ref 98–107)
CHOLEST SERPL-MCNC: 166 MG/DL (ref 0–200)
CO2 SERPL-SCNC: 24.8 MMOL/L (ref 22–29)
CREAT SERPL-MCNC: 0.7 MG/DL (ref 0.57–1)
GLOBULIN SER CALC-MCNC: 2.5 GM/DL
GLUCOSE SERPL-MCNC: 168 MG/DL (ref 65–99)
HBA1C MFR BLD: 6.1 % (ref 4.8–5.6)
HDLC SERPL-MCNC: 87 MG/DL (ref 40–60)
LDLC SERPL CALC-MCNC: 62 MG/DL (ref 0–100)
POTASSIUM SERPL-SCNC: 4.1 MMOL/L (ref 3.5–5.2)
PROT SERPL-MCNC: 6.6 G/DL (ref 6–8.5)
SODIUM SERPL-SCNC: 140 MMOL/L (ref 136–145)
T4 FREE SERPL-MCNC: 1.6 NG/DL (ref 0.93–1.7)
TRIGL SERPL-MCNC: 85 MG/DL (ref 0–150)
TSH SERPL DL<=0.005 MIU/L-ACNC: 2.49 MIU/ML (ref 0.27–4.2)
VLDLC SERPL CALC-MCNC: 17 MG/DL (ref 5–40)

## 2017-11-06 ENCOUNTER — APPOINTMENT (OUTPATIENT)
Dept: WOMENS IMAGING | Facility: HOSPITAL | Age: 72
End: 2017-11-06

## 2017-11-06 PROCEDURE — G0202 SCR MAMMO BI INCL CAD: HCPCS | Performed by: RADIOLOGY

## 2017-11-06 PROCEDURE — 77063 BREAST TOMOSYNTHESIS BI: CPT | Performed by: RADIOLOGY

## 2017-11-06 PROCEDURE — MDREVIEWSP: Performed by: RADIOLOGY

## 2017-12-14 RX ORDER — ATORVASTATIN CALCIUM 10 MG/1
10 TABLET, FILM COATED ORAL DAILY
Qty: 30 TABLET | Refills: 5 | Status: SHIPPED | OUTPATIENT
Start: 2017-12-14 | End: 2018-01-04 | Stop reason: SDUPTHER

## 2017-12-15 ENCOUNTER — OFFICE VISIT (OUTPATIENT)
Dept: ENDOCRINOLOGY | Age: 72
End: 2017-12-15

## 2017-12-15 VITALS
HEIGHT: 58 IN | DIASTOLIC BLOOD PRESSURE: 62 MMHG | SYSTOLIC BLOOD PRESSURE: 124 MMHG | WEIGHT: 104.6 LBS | BODY MASS INDEX: 21.96 KG/M2

## 2017-12-15 DIAGNOSIS — E03.9 PRIMARY HYPOTHYROIDISM: ICD-10-CM

## 2017-12-15 DIAGNOSIS — I10 ESSENTIAL HYPERTENSION: ICD-10-CM

## 2017-12-15 DIAGNOSIS — E10.8 TYPE 1 DIABETES MELLITUS WITH COMPLICATIONS (HCC): Primary | ICD-10-CM

## 2017-12-15 DIAGNOSIS — E10.42 DIABETIC PERIPHERAL NEUROPATHY ASSOCIATED WITH TYPE 1 DIABETES MELLITUS (HCC): ICD-10-CM

## 2017-12-15 DIAGNOSIS — Z96.41 INSULIN PUMP STATUS: ICD-10-CM

## 2017-12-15 PROCEDURE — 99214 OFFICE O/P EST MOD 30 MIN: CPT | Performed by: INTERNAL MEDICINE

## 2017-12-15 RX ORDER — CITALOPRAM 20 MG/1
TABLET ORAL
COMMUNITY
Start: 2017-12-02

## 2017-12-15 NOTE — PROGRESS NOTES
Subjective   Leatha Vazquez is a 72 y.o. female.     HPI Comments: F/u for dm 1, hypertension, hypothyroidism, COPD, atrial fib / testing bs 8-9 x day / last dm eye exam 9/18/17 with dr King / last dm foot exam 5/8/17 with dr Mckoy / flu vaccine and Tdap @ [facility]     Diabetes   Hypoglycemia symptoms include nervousness/anxiousness.   Hyperlipidemia   Exacerbating diseases include hypothyroidism.   Hypothyroidism     COPD     Atrial Fibrillation   Past medical history includes atrial fibrillation and hyperlipidemia.      Patient is a 71-year-old female who came in for followup. She was diagnosed to have diabetes mellitus in 1982 and was started on insulin then. She was started on an insulin pump in 2006. She is using an Medtronic Paradigm pump since 2012.      Basal rates: 12 midnight is 0.65. 2 AM is 0.5. 7 AM is 0.4. 1 PM is 0.35. 8 PM is 0.55.      Bolus: 1 unit per 15 gram CHO      Sensitivity: 1 unit per 40 above 120-150.       She checks blood sugar at least 4 times a day.  Fasting glucose runs between .  Mealtime blood sugar runs between .  She denies any severe hypoglycemic episode.  She has no significant weight change since 9/17.  Her last meal was 9 AM      Her last eye examination was in June 2017. She has macular degeneration and cataracts but no diabetic retinopathy.. She has numbness in her feet but no pain. She has no microalbuminuria on urine sample taken last 1/17      She has history of hypertension and paroxysmal atrial fibrillation. She is on Norvasc, Coreg, Lasix, Xarelto and lisinopril. She follows with Dr. Anthony. She denies any history of hyperlipidemia, heart attack or stroke. She denies any chest pain, palpitations, or pedal edema.      She has COPD and follows with Dr. Jacobo. She denies increased SOB.  She is on Dulera and albuterol. She denies increased sputum production.  She had a flu vac this fall.      She has primary hypothyroidism and has been on levothyroxine 50 µg  "per day. She denies heat/cold intolerance.  She denies bowel changes.       Depression has improved with WellbutrinXL prescribed by Dr. Kuhn. She is going through grief counseling.    The following portions of the patient's history were reviewed and updated as appropriate: allergies, current medications, past family history, past medical history, past social history, past surgical history and problem list.    Review of Systems   Constitutional: Negative.    HENT: Negative.    Eyes: Negative.    Respiratory: Negative.    Cardiovascular: Negative.    Gastrointestinal: Negative.    Endocrine: Negative.    Genitourinary: Negative.    Musculoskeletal: Negative.    Skin: Negative.    Allergic/Immunologic: Negative.    Neurological: Negative.    Hematological: Bruises/bleeds easily ( xarelto ).   Psychiatric/Behavioral: The patient is nervous/anxious.        Objective      Vitals:    12/15/17 1148   BP: 124/62   BP Location: Left arm   Patient Position: Sitting   Cuff Size: Small Adult   Weight: 47.4 kg (104 lb 9.6 oz)   Height: 147.3 cm (57.99\")     Physical Exam   Constitutional: She is oriented to person, place, and time. She appears well-developed and well-nourished. No distress.   HENT:   Head: Normocephalic.   Nose: Nose normal.   Mouth/Throat: No oropharyngeal exudate.   Eyes: Conjunctivae and EOM are normal. Right eye exhibits no discharge. Left eye exhibits no discharge. No scleral icterus.   Neck: Neck supple. No JVD present. No tracheal deviation present. No thyromegaly present.   Cardiovascular: Normal rate, regular rhythm, normal heart sounds and intact distal pulses.  Exam reveals no gallop and no friction rub.    No murmur heard.  Pulmonary/Chest: Effort normal and breath sounds normal. No respiratory distress. She has no wheezes. She has no rales.   Abdominal: Soft. Bowel sounds are normal. She exhibits no distension and no mass. There is no tenderness.   Musculoskeletal: She exhibits no edema, tenderness " or deformity.   Lymphadenopathy:     She has no cervical adenopathy.   Neurological: She is alert and oriented to person, place, and time. She displays normal reflexes. Coordination normal.   Skin: Skin is warm and dry. No rash noted. No erythema. No pallor.   Psychiatric: She has a normal mood and affect. Her behavior is normal.     Office Visit on 09/15/2017   Component Date Value Ref Range Status   • Glucose 09/15/2017 168* 65 - 99 mg/dL Final   • BUN 09/15/2017 13  8 - 23 mg/dL Final   • Creatinine 09/15/2017 0.70  0.57 - 1.00 mg/dL Final   • eGFR Non  Am 09/15/2017 82  >60 mL/min/1.73 Final    Comment: The MDRD GFR formula is only valid for adults with stable  renal function between ages 18 and 70.     • eGFR  Am 09/15/2017 99  >60 mL/min/1.73 Final   • BUN/Creatinine Ratio 09/15/2017 18.6  7.0 - 25.0 Final   • Sodium 09/15/2017 140  136 - 145 mmol/L Final   • Potassium 09/15/2017 4.1  3.5 - 5.2 mmol/L Final   • Chloride 09/15/2017 97* 98 - 107 mmol/L Final   • Total CO2 09/15/2017 24.8  22.0 - 29.0 mmol/L Final   • Calcium 09/15/2017 10.0  8.6 - 10.5 mg/dL Final   • Total Protein 09/15/2017 6.6  6.0 - 8.5 g/dL Final   • Albumin 09/15/2017 4.10  3.50 - 5.20 g/dL Final   • Globulin 09/15/2017 2.5  gm/dL Final   • A/G Ratio 09/15/2017 1.6  g/dL Final   • Total Bilirubin 09/15/2017 0.5  0.1 - 1.2 mg/dL Final   • Alkaline Phosphatase 09/15/2017 73  39 - 117 U/L Final   • AST (SGOT) 09/15/2017 19  1 - 32 U/L Final   • ALT (SGPT) 09/15/2017 17  1 - 33 U/L Final   • Total Cholesterol 09/15/2017 166  0 - 200 mg/dL Final   • Triglycerides 09/15/2017 85  0 - 150 mg/dL Final   • HDL Cholesterol 09/15/2017 87* 40 - 60 mg/dL Final   • VLDL Cholesterol 09/15/2017 17  5 - 40 mg/dL Final   • LDL Cholesterol  09/15/2017 62  0 - 100 mg/dL Final   • Hemoglobin A1C 09/15/2017 6.10* 4.80 - 5.60 % Final    Comment: Hemoglobin A1C Ranges:  Increased Risk for Diabetes  5.7% to 6.4%  Diabetes                     >=  6.5%  Diabetic Goal                < 7.0%     • TSH 09/15/2017 2.490  0.270 - 4.200 mIU/mL Final   • Free T4 09/15/2017 1.60  0.93 - 1.70 ng/dL Final     Assessment/Plan   Leatha was seen today for diabetes, hyperlipidemia, hypothyroidism, copd and atrial fibrillation.    Diagnoses and all orders for this visit:    Type 1 diabetes mellitus with complications  -     Comprehensive Metabolic Panel  -     Lipid Panel  -     Hemoglobin A1c  -     TSH  -     T4, Free    Diabetic peripheral neuropathy associated with type 1 diabetes mellitus  -     Hemoglobin A1c    Insulin pump status  -     Hemoglobin A1c    Essential hypertension    Primary hypothyroidism  -     TSH  -     T4, Free      Continue current insulin pump settings.  Continue levothyroxine 50 µg per day.  Continue Norvasc, Coreg, Lasix, lisinopril and Xarelto.    Send copy of my notes and labs to Dr. Kuhn, Dr. King, Dr. Jacobo and Dr. Anthony    RTC 4 mos

## 2017-12-16 LAB
ALBUMIN SERPL-MCNC: 3.9 G/DL (ref 3.5–5.2)
ALBUMIN/GLOB SERPL: 1.5 G/DL
ALP SERPL-CCNC: 95 U/L (ref 39–117)
ALT SERPL-CCNC: 12 U/L (ref 1–33)
AST SERPL-CCNC: 14 U/L (ref 1–32)
BILIRUB SERPL-MCNC: 0.7 MG/DL (ref 0.1–1.2)
BUN SERPL-MCNC: 12 MG/DL (ref 8–23)
BUN/CREAT SERPL: 21.8 (ref 7–25)
CALCIUM SERPL-MCNC: 8.8 MG/DL (ref 8.6–10.5)
CHLORIDE SERPL-SCNC: 96 MMOL/L (ref 98–107)
CHOLEST SERPL-MCNC: 149 MG/DL (ref 0–200)
CO2 SERPL-SCNC: 26.7 MMOL/L (ref 22–29)
CREAT SERPL-MCNC: 0.55 MG/DL (ref 0.57–1)
GFR SERPLBLD CREATININE-BSD FMLA CKD-EPI: 108 ML/MIN/1.73
GFR SERPLBLD CREATININE-BSD FMLA CKD-EPI: 131 ML/MIN/1.73
GLOBULIN SER CALC-MCNC: 2.6 GM/DL
GLUCOSE SERPL-MCNC: 89 MG/DL (ref 65–99)
HBA1C MFR BLD: 6 % (ref 4.8–5.6)
HDLC SERPL-MCNC: 101 MG/DL (ref 40–60)
INTERPRETATION: NORMAL
LDLC SERPL CALC-MCNC: 37 MG/DL (ref 0–100)
POTASSIUM SERPL-SCNC: 3.8 MMOL/L (ref 3.5–5.2)
PROT SERPL-MCNC: 6.5 G/DL (ref 6–8.5)
SODIUM SERPL-SCNC: 138 MMOL/L (ref 136–145)
T4 FREE SERPL-MCNC: 1.58 NG/DL (ref 0.93–1.7)
TRIGL SERPL-MCNC: 57 MG/DL (ref 0–150)
TSH SERPL DL<=0.005 MIU/L-ACNC: 2.29 MIU/ML (ref 0.27–4.2)
VLDLC SERPL CALC-MCNC: 11.4 MG/DL (ref 5–40)

## 2018-01-04 RX ORDER — ATORVASTATIN CALCIUM 10 MG/1
10 TABLET, FILM COATED ORAL DAILY
Qty: 90 TABLET | Refills: 1 | Status: SHIPPED | OUTPATIENT
Start: 2018-01-04 | End: 2019-01-04

## 2018-01-16 RX ORDER — LANCETS
EACH MISCELLANEOUS
Qty: 900 EACH | Refills: 1 | Status: SHIPPED | OUTPATIENT
Start: 2018-01-16

## 2018-01-16 RX ORDER — BLOOD SUGAR DIAGNOSTIC
STRIP MISCELLANEOUS
Qty: 900 EACH | Refills: 1 | Status: SHIPPED | OUTPATIENT
Start: 2018-01-16

## 2018-01-16 RX ORDER — BLOOD-GLUCOSE METER
EACH MISCELLANEOUS
Qty: 1 KIT | Refills: 1 | Status: SHIPPED | OUTPATIENT
Start: 2018-01-16

## 2018-02-26 RX ORDER — FUROSEMIDE 20 MG/1
TABLET ORAL
Qty: 90 TABLET | Refills: 0 | Status: SHIPPED | OUTPATIENT
Start: 2018-02-26